# Patient Record
Sex: FEMALE | Race: OTHER | Employment: UNEMPLOYED | URBAN - METROPOLITAN AREA
[De-identification: names, ages, dates, MRNs, and addresses within clinical notes are randomized per-mention and may not be internally consistent; named-entity substitution may affect disease eponyms.]

---

## 2021-04-21 ENCOUNTER — HOSPITAL ENCOUNTER (OUTPATIENT)
Age: 21
Setting detail: SPECIMEN
Discharge: HOME | End: 2021-04-21

## 2021-04-23 LAB
CULTURE: NORMAL
Lab: NORMAL
SPECIMEN DESCRIPTION: NORMAL

## 2021-07-14 ENCOUNTER — NURSE ONLY (OUTPATIENT)
Dept: LAB | Age: 21
End: 2021-07-14

## 2021-07-14 LAB
ERYTHROCYTE [DISTWIDTH] IN BLOOD BY AUTOMATED COUNT: 13.3 % (ref 11.5–14.5)
ERYTHROCYTE [DISTWIDTH] IN BLOOD BY AUTOMATED COUNT: 45.5 FL (ref 35–45)
GESTATIONAL GLUCOSE SCREEN: 117 MG/DL (ref 69–140)
GESTATIONAL SCREEN BASELINE: 91 MG/DL (ref 69–105)
HCT VFR BLD CALC: 34.9 % (ref 37–47)
HEMOGLOBIN: 11.2 GM/DL (ref 12–16)
MCH RBC QN AUTO: 30.2 PG (ref 26–33)
MCHC RBC AUTO-ENTMCNC: 32.1 GM/DL (ref 32.2–35.5)
MCV RBC AUTO: 94.1 FL (ref 81–99)
PLATELET # BLD: 294 THOU/MM3 (ref 130–400)
PMV BLD AUTO: 10.7 FL (ref 9.4–12.4)
RBC # BLD: 3.71 MILL/MM3 (ref 4.2–5.4)
WBC # BLD: 11.7 THOU/MM3 (ref 4.8–10.8)

## 2021-10-04 ENCOUNTER — HOSPITAL ENCOUNTER (OUTPATIENT)
Age: 21
LOS: 1 days | Discharge: HOME | End: 2021-10-04
Attending: OBSTETRICS & GYNECOLOGY | Admitting: OBSTETRICS & GYNECOLOGY

## 2021-10-04 VITALS
WEIGHT: 160 LBS | RESPIRATION RATE: 16 BRPM | HEART RATE: 87 BPM | TEMPERATURE: 98 F | BODY MASS INDEX: 26.66 KG/M2 | DIASTOLIC BLOOD PRESSURE: 68 MMHG | OXYGEN SATURATION: 98 % | HEIGHT: 65 IN | SYSTOLIC BLOOD PRESSURE: 109 MMHG

## 2021-10-04 PROBLEM — O47.9 FALSE LABOR: Status: ACTIVE | Noted: 2021-10-04

## 2021-10-04 PROCEDURE — 2580000003 HC RX 258: Performed by: OBSTETRICS & GYNECOLOGY

## 2021-10-04 PROCEDURE — 6370000000 HC RX 637 (ALT 250 FOR IP): Performed by: OBSTETRICS & GYNECOLOGY

## 2021-10-04 PROCEDURE — 96361 HYDRATE IV INFUSION ADD-ON: CPT

## 2021-10-04 PROCEDURE — 96360 HYDRATION IV INFUSION INIT: CPT

## 2021-10-04 RX ORDER — SODIUM CHLORIDE, SODIUM LACTATE, POTASSIUM CHLORIDE, AND CALCIUM CHLORIDE .6; .31; .03; .02 G/100ML; G/100ML; G/100ML; G/100ML
1000 INJECTION, SOLUTION INTRAVENOUS ONCE
Status: COMPLETED | OUTPATIENT
Start: 2021-10-04 | End: 2021-10-04

## 2021-10-04 RX ORDER — ACETAMINOPHEN 500 MG
1000 TABLET ORAL EVERY 6 HOURS PRN
Status: DISCONTINUED | OUTPATIENT
Start: 2021-10-04 | End: 2021-10-04 | Stop reason: HOSPADM

## 2021-10-04 RX ADMIN — ACETAMINOPHEN 1000 MG: 500 TABLET ORAL at 07:28

## 2021-10-04 RX ADMIN — SODIUM CHLORIDE, POTASSIUM CHLORIDE, SODIUM LACTATE AND CALCIUM CHLORIDE 1000 ML: 600; 310; 30; 20 INJECTION, SOLUTION INTRAVENOUS at 07:25

## 2021-10-04 RX ADMIN — SODIUM CHLORIDE, POTASSIUM CHLORIDE, SODIUM LACTATE AND CALCIUM CHLORIDE 1000 ML: 600; 310; 30; 20 INJECTION, SOLUTION INTRAVENOUS at 08:12

## 2021-10-04 ASSESSMENT — PAIN SCALES - GENERAL: PAINLEVEL_OUTOF10: 8

## 2021-10-04 ASSESSMENT — PAIN DESCRIPTION - DESCRIPTORS: DESCRIPTORS: CRAMPING

## 2021-10-04 NOTE — FLOWSHEET NOTE
2nd bag of LR bolus almost complete. Pt's contractions have spaced out but according to the  (Video Remote Interpreting by [employer] ipad) her contractions remain at an 8/10 when she does have them. Pt states the tylenol hasn't helped at all. Will recheck cervix and update Dr Yaquelin Lee. Pt denies any questions or other concerns.

## 2021-10-04 NOTE — FLOWSHEET NOTE
Pt's friend, Kobe Garrido, notified that we are discharging her home. States she will come and pick her up.

## 2021-10-04 NOTE — PLAN OF CARE
Problem: Pain:  Goal: Pain level will decrease  Description: Pain level will decrease  Outcome: Completed  Goal: Control of acute pain  Description: Control of acute pain  Outcome: Ongoing    Pt c/o contraction pain, 8/10. Tylenol given, will continue to monitor. Pain goal 4/10    Goal: Control of chronic pain  Description: Control of chronic pain  Outcome: Completed     Problem: Healthcare acquired conditions:  Goal: Absence of healthcare acquired conditions  Description: Absence of healthcare acquired conditions  Outcome: Ongoing    Vitals stable. EFM strip reactive. Will continue to monitor. Problem: Discharge Planning:  Goal: Discharged to appropriate level of care  Description: Discharged to appropriate level of care  Outcome: Ongoing   Pt ok with going home if the contractions slow down. Denies any other needs at this time. Sofia Lake,  at bedside along with the video interpreting service. Care plan reviewed with patient via . Patient and  Sofia Lake verbalize understanding of the plan of care and contribute to goal setting.

## 2021-10-04 NOTE — FLOWSHEET NOTE
Dr Romy Reyes phoned unit for update. EFM strip remains reactive. Pt's contractions have spaced out but she states they are still a 8/10 and the tylenol didn't help. VE unchanged. Dr Romy Reyes states to discharge pt home and she needs to rest today and drink 10 bottles of water. Pt does not drink enough water at home on a daily basis. Pt is scheduled Thursday for her repeat c/s. Will use  ipad for discharge.

## 2021-10-04 NOTE — FLOWSHEET NOTE
phone used with Génesis Alejandre #[id number]. RN and  reviewing discharge instructions reviewed, pt voiced understanding.

## 2021-10-04 NOTE — FLOWSHEET NOTE
38w 6d patient of Dr. Stephany Deluca presents to L&D for c/o leaking of fluids and pain. Patient notes she is to be a repeat  and is scheduled on Thursday. Pt notes positive fetal movement. Leaking of fluids that started last night. Denies any vaginal bleeding. Contractions are irregular. Gown given to change into. Patient to bathroom to void, informed of maternal drug testing policy in place on all laboring patients. Verbal consent received, paper consent to be signed and urine to be sent. Explained patients right to have family, representative or physician notified of their admission. Patient has Declined for physician to be notified. Patient has Declined for family/representative to be notified.

## 2021-10-07 ENCOUNTER — ANESTHESIA EVENT (OUTPATIENT)
Dept: LABOR AND DELIVERY | Age: 21
DRG: 540 | End: 2021-10-07
Payer: MEDICAID

## 2021-10-07 ENCOUNTER — HOSPITAL ENCOUNTER (INPATIENT)
Age: 21
LOS: 2 days | Discharge: HOME | DRG: 540 | End: 2021-10-09
Attending: OBSTETRICS & GYNECOLOGY | Admitting: OBSTETRICS & GYNECOLOGY
Payer: MEDICAID

## 2021-10-07 ENCOUNTER — ANESTHESIA (OUTPATIENT)
Dept: LABOR AND DELIVERY | Age: 21
DRG: 540 | End: 2021-10-07
Payer: MEDICAID

## 2021-10-07 VITALS — OXYGEN SATURATION: 100 % | SYSTOLIC BLOOD PRESSURE: 118 MMHG | DIASTOLIC BLOOD PRESSURE: 70 MMHG

## 2021-10-07 LAB
ABO: NORMAL
AMPHETAMINE+METHAMPHETAMINE URINE SCREEN: NEGATIVE
ANTIBODY SCREEN: NORMAL
BARBITURATE QUANTITATIVE URINE: NEGATIVE
BENZODIAZEPINE QUANTITATIVE URINE: NEGATIVE
CANNABINOID QUANTITATIVE URINE: NEGATIVE
COCAINE METABOLITE QUANTITATIVE URINE: NEGATIVE
ERYTHROCYTE [DISTWIDTH] IN BLOOD BY AUTOMATED COUNT: 13.2 % (ref 11.5–14.5)
ERYTHROCYTE [DISTWIDTH] IN BLOOD BY AUTOMATED COUNT: 43.9 FL (ref 35–45)
HCT VFR BLD CALC: 36.5 % (ref 37–47)
HEMOGLOBIN: 12 GM/DL (ref 12–16)
MCH RBC QN AUTO: 30 PG (ref 26–33)
MCHC RBC AUTO-ENTMCNC: 32.9 GM/DL (ref 32.2–35.5)
MCV RBC AUTO: 91.3 FL (ref 81–99)
OPIATES, URINE: NEGATIVE
OXYCODONE: NEGATIVE
PHENCYCLIDINE QUANTITATIVE URINE: NEGATIVE
PLATELET # BLD: 265 THOU/MM3 (ref 130–400)
PMV BLD AUTO: 10.4 FL (ref 9.4–12.4)
RBC # BLD: 4 MILL/MM3 (ref 4.2–5.4)
RH FACTOR: NORMAL
RPR: NONREACTIVE
WBC # BLD: 8.1 THOU/MM3 (ref 4.8–10.8)

## 2021-10-07 PROCEDURE — 36415 COLL VENOUS BLD VENIPUNCTURE: CPT

## 2021-10-07 PROCEDURE — 2500000003 HC RX 250 WO HCPCS: Performed by: OBSTETRICS & GYNECOLOGY

## 2021-10-07 PROCEDURE — 1220000000 HC SEMI PRIVATE OB R&B

## 2021-10-07 PROCEDURE — 6370000000 HC RX 637 (ALT 250 FOR IP): Performed by: OBSTETRICS & GYNECOLOGY

## 2021-10-07 PROCEDURE — 6360000002 HC RX W HCPCS

## 2021-10-07 PROCEDURE — 2709999900 HC NON-CHARGEABLE SUPPLY: Performed by: OBSTETRICS & GYNECOLOGY

## 2021-10-07 PROCEDURE — 2580000003 HC RX 258: Performed by: ANESTHESIOLOGY

## 2021-10-07 PROCEDURE — 80307 DRUG TEST PRSMV CHEM ANLYZR: CPT

## 2021-10-07 PROCEDURE — 86592 SYPHILIS TEST NON-TREP QUAL: CPT

## 2021-10-07 PROCEDURE — 86901 BLOOD TYPING SEROLOGIC RH(D): CPT

## 2021-10-07 PROCEDURE — 3700000000 HC ANESTHESIA ATTENDED CARE: Performed by: OBSTETRICS & GYNECOLOGY

## 2021-10-07 PROCEDURE — 6360000002 HC RX W HCPCS: Performed by: OBSTETRICS & GYNECOLOGY

## 2021-10-07 PROCEDURE — 3609079900 HC CESAREAN SECTION: Performed by: OBSTETRICS & GYNECOLOGY

## 2021-10-07 PROCEDURE — 3700000001 HC ADD 15 MINUTES (ANESTHESIA): Performed by: OBSTETRICS & GYNECOLOGY

## 2021-10-07 PROCEDURE — 2580000003 HC RX 258: Performed by: OBSTETRICS & GYNECOLOGY

## 2021-10-07 PROCEDURE — 86850 RBC ANTIBODY SCREEN: CPT

## 2021-10-07 PROCEDURE — 2500000003 HC RX 250 WO HCPCS: Performed by: NURSE ANESTHETIST, CERTIFIED REGISTERED

## 2021-10-07 PROCEDURE — 6360000002 HC RX W HCPCS: Performed by: ANESTHESIOLOGY

## 2021-10-07 PROCEDURE — 6360000002 HC RX W HCPCS: Performed by: NURSE ANESTHETIST, CERTIFIED REGISTERED

## 2021-10-07 PROCEDURE — 7100000001 HC PACU RECOVERY - ADDTL 15 MIN: Performed by: OBSTETRICS & GYNECOLOGY

## 2021-10-07 PROCEDURE — 86900 BLOOD TYPING SEROLOGIC ABO: CPT

## 2021-10-07 PROCEDURE — 7100000000 HC PACU RECOVERY - FIRST 15 MIN: Performed by: OBSTETRICS & GYNECOLOGY

## 2021-10-07 PROCEDURE — 85027 COMPLETE CBC AUTOMATED: CPT

## 2021-10-07 RX ORDER — BUPIVACAINE HYDROCHLORIDE 7.5 MG/ML
INJECTION, SOLUTION INTRASPINAL PRN
Status: DISCONTINUED | OUTPATIENT
Start: 2021-10-07 | End: 2021-10-07 | Stop reason: SDUPTHER

## 2021-10-07 RX ORDER — CEFAZOLIN SODIUM 1 G/50ML
1000 INJECTION, SOLUTION INTRAVENOUS ONCE
Status: COMPLETED | OUTPATIENT
Start: 2021-10-07 | End: 2021-10-07

## 2021-10-07 RX ORDER — 0.9 % SODIUM CHLORIDE 0.9 %
1000 INTRAVENOUS SOLUTION INTRAVENOUS ONCE
Status: COMPLETED | OUTPATIENT
Start: 2021-10-07 | End: 2021-10-07

## 2021-10-07 RX ORDER — PROMETHAZINE HYDROCHLORIDE 25 MG/ML
12.5 INJECTION, SOLUTION INTRAMUSCULAR; INTRAVENOUS EVERY 6 HOURS PRN
Status: DISCONTINUED | OUTPATIENT
Start: 2021-10-07 | End: 2021-10-09 | Stop reason: HOSPADM

## 2021-10-07 RX ORDER — KETOROLAC TROMETHAMINE 30 MG/ML
15 INJECTION, SOLUTION INTRAMUSCULAR; INTRAVENOUS EVERY 6 HOURS
Status: COMPLETED | OUTPATIENT
Start: 2021-10-07 | End: 2021-10-08

## 2021-10-07 RX ORDER — PRENATAL WITH FERROUS FUM AND FOLIC ACID 3080; 920; 120; 400; 22; 1.84; 3; 20; 10; 1; 12; 200; 27; 25; 2 [IU]/1; [IU]/1; MG/1; [IU]/1; MG/1; MG/1; MG/1; MG/1; MG/1; MG/1; UG/1; MG/1; MG/1; MG/1; MG/1
1 TABLET ORAL DAILY
Status: DISCONTINUED | OUTPATIENT
Start: 2021-10-07 | End: 2021-10-09 | Stop reason: HOSPADM

## 2021-10-07 RX ORDER — MORPHINE SULFATE 4 MG/ML
4 INJECTION, SOLUTION INTRAMUSCULAR; INTRAVENOUS
Status: DISCONTINUED | OUTPATIENT
Start: 2021-10-07 | End: 2021-10-09 | Stop reason: HOSPADM

## 2021-10-07 RX ORDER — TRISODIUM CITRATE DIHYDRATE AND CITRIC ACID MONOHYDRATE 500; 334 MG/5ML; MG/5ML
15 SOLUTION ORAL ONCE
Status: COMPLETED | OUTPATIENT
Start: 2021-10-07 | End: 2021-10-07

## 2021-10-07 RX ORDER — METHYLERGONOVINE MALEATE 0.2 MG/ML
200 INJECTION INTRAVENOUS PRN
Status: DISCONTINUED | OUTPATIENT
Start: 2021-10-07 | End: 2021-10-09 | Stop reason: HOSPADM

## 2021-10-07 RX ORDER — SODIUM CHLORIDE, SODIUM LACTATE, POTASSIUM CHLORIDE, CALCIUM CHLORIDE 600; 310; 30; 20 MG/100ML; MG/100ML; MG/100ML; MG/100ML
INJECTION, SOLUTION INTRAVENOUS CONTINUOUS
Status: DISCONTINUED | OUTPATIENT
Start: 2021-10-07 | End: 2021-10-09 | Stop reason: HOSPADM

## 2021-10-07 RX ORDER — KETOROLAC TROMETHAMINE 30 MG/ML
INJECTION, SOLUTION INTRAMUSCULAR; INTRAVENOUS PRN
Status: DISCONTINUED | OUTPATIENT
Start: 2021-10-07 | End: 2021-10-07

## 2021-10-07 RX ORDER — HYDROCODONE BITARTRATE AND ACETAMINOPHEN 5; 325 MG/1; MG/1
1 TABLET ORAL EVERY 4 HOURS PRN
Status: ACTIVE | OUTPATIENT
Start: 2021-10-07 | End: 2021-10-08

## 2021-10-07 RX ORDER — FERROUS SULFATE 325(65) MG
325 TABLET ORAL
Status: DISCONTINUED | OUTPATIENT
Start: 2021-10-08 | End: 2021-10-09 | Stop reason: HOSPADM

## 2021-10-07 RX ORDER — KETOROLAC TROMETHAMINE 30 MG/ML
INJECTION, SOLUTION INTRAMUSCULAR; INTRAVENOUS PRN
Status: DISCONTINUED | OUTPATIENT
Start: 2021-10-07 | End: 2021-10-07 | Stop reason: SDUPTHER

## 2021-10-07 RX ORDER — MISOPROSTOL 200 UG/1
800 TABLET ORAL PRN
Status: DISCONTINUED | OUTPATIENT
Start: 2021-10-07 | End: 2021-10-09 | Stop reason: HOSPADM

## 2021-10-07 RX ORDER — MORPHINE SULFATE 2 MG/ML
2 INJECTION, SOLUTION INTRAMUSCULAR; INTRAVENOUS
Status: DISCONTINUED | OUTPATIENT
Start: 2021-10-07 | End: 2021-10-09 | Stop reason: HOSPADM

## 2021-10-07 RX ORDER — OXYCODONE HYDROCHLORIDE 5 MG/1
5 TABLET ORAL EVERY 4 HOURS PRN
Status: DISCONTINUED | OUTPATIENT
Start: 2021-10-07 | End: 2021-10-09 | Stop reason: HOSPADM

## 2021-10-07 RX ORDER — ACETAMINOPHEN 500 MG
1000 TABLET ORAL EVERY 8 HOURS SCHEDULED
Status: DISCONTINUED | OUTPATIENT
Start: 2021-10-07 | End: 2021-10-09 | Stop reason: HOSPADM

## 2021-10-07 RX ORDER — IBUPROFEN 800 MG/1
800 TABLET ORAL EVERY 8 HOURS
Status: DISCONTINUED | OUTPATIENT
Start: 2021-10-08 | End: 2021-10-09 | Stop reason: HOSPADM

## 2021-10-07 RX ORDER — ONDANSETRON 2 MG/ML
INJECTION INTRAMUSCULAR; INTRAVENOUS PRN
Status: DISCONTINUED | OUTPATIENT
Start: 2021-10-07 | End: 2021-10-07 | Stop reason: SDUPTHER

## 2021-10-07 RX ORDER — ONDANSETRON 2 MG/ML
4 INJECTION INTRAMUSCULAR; INTRAVENOUS EVERY 6 HOURS PRN
Status: DISCONTINUED | OUTPATIENT
Start: 2021-10-07 | End: 2021-10-07

## 2021-10-07 RX ORDER — ONDANSETRON 2 MG/ML
4 INJECTION INTRAMUSCULAR; INTRAVENOUS EVERY 6 HOURS PRN
Status: DISCONTINUED | OUTPATIENT
Start: 2021-10-07 | End: 2021-10-09 | Stop reason: HOSPADM

## 2021-10-07 RX ORDER — DIPHENHYDRAMINE HYDROCHLORIDE 50 MG/ML
25 INJECTION INTRAMUSCULAR; INTRAVENOUS EVERY 6 HOURS PRN
Status: DISCONTINUED | OUTPATIENT
Start: 2021-10-07 | End: 2021-10-09 | Stop reason: HOSPADM

## 2021-10-07 RX ORDER — BISACODYL 10 MG
10 SUPPOSITORY, RECTAL RECTAL DAILY PRN
Status: DISCONTINUED | OUTPATIENT
Start: 2021-10-07 | End: 2021-10-09 | Stop reason: HOSPADM

## 2021-10-07 RX ORDER — FENTANYL CITRATE 50 UG/ML
INJECTION, SOLUTION INTRAMUSCULAR; INTRAVENOUS PRN
Status: DISCONTINUED | OUTPATIENT
Start: 2021-10-07 | End: 2021-10-07 | Stop reason: SDUPTHER

## 2021-10-07 RX ORDER — SODIUM CHLORIDE 0.9 % (FLUSH) 0.9 %
10 SYRINGE (ML) INJECTION PRN
Status: DISCONTINUED | OUTPATIENT
Start: 2021-10-07 | End: 2021-10-09 | Stop reason: HOSPADM

## 2021-10-07 RX ORDER — MODIFIED LANOLIN
OINTMENT (GRAM) TOPICAL
Status: DISCONTINUED | OUTPATIENT
Start: 2021-10-07 | End: 2021-10-09 | Stop reason: HOSPADM

## 2021-10-07 RX ORDER — SIMETHICONE 80 MG
80 TABLET,CHEWABLE ORAL EVERY 6 HOURS PRN
Status: DISCONTINUED | OUTPATIENT
Start: 2021-10-07 | End: 2021-10-09 | Stop reason: HOSPADM

## 2021-10-07 RX ORDER — HYDROCODONE BITARTRATE AND ACETAMINOPHEN 5; 325 MG/1; MG/1
2 TABLET ORAL EVERY 4 HOURS PRN
Status: DISPENSED | OUTPATIENT
Start: 2021-10-07 | End: 2021-10-08

## 2021-10-07 RX ORDER — MORPHINE SULFATE 0.5 MG/ML
INJECTION, SOLUTION EPIDURAL; INTRATHECAL; INTRAVENOUS PRN
Status: DISCONTINUED | OUTPATIENT
Start: 2021-10-07 | End: 2021-10-07 | Stop reason: SDUPTHER

## 2021-10-07 RX ORDER — ONDANSETRON 4 MG/1
8 TABLET, ORALLY DISINTEGRATING ORAL EVERY 8 HOURS PRN
Status: DISCONTINUED | OUTPATIENT
Start: 2021-10-07 | End: 2021-10-09 | Stop reason: HOSPADM

## 2021-10-07 RX ORDER — DOCUSATE SODIUM 100 MG/1
100 CAPSULE, LIQUID FILLED ORAL 2 TIMES DAILY
Status: DISCONTINUED | OUTPATIENT
Start: 2021-10-07 | End: 2021-10-09 | Stop reason: HOSPADM

## 2021-10-07 RX ORDER — METOCLOPRAMIDE HYDROCHLORIDE 5 MG/ML
10 INJECTION INTRAMUSCULAR; INTRAVENOUS ONCE
Status: COMPLETED | OUTPATIENT
Start: 2021-10-07 | End: 2021-10-07

## 2021-10-07 RX ORDER — DIPHENHYDRAMINE HYDROCHLORIDE 50 MG/ML
25 INJECTION INTRAMUSCULAR; INTRAVENOUS EVERY 6 HOURS PRN
Status: ACTIVE | OUTPATIENT
Start: 2021-10-07 | End: 2021-10-08

## 2021-10-07 RX ORDER — ONDANSETRON 2 MG/ML
4 INJECTION INTRAMUSCULAR; INTRAVENOUS EVERY 6 HOURS PRN
Status: DISPENSED | OUTPATIENT
Start: 2021-10-07 | End: 2021-10-08

## 2021-10-07 RX ORDER — SODIUM CHLORIDE 0.9 % (FLUSH) 0.9 %
10 SYRINGE (ML) INJECTION EVERY 12 HOURS SCHEDULED
Status: DISCONTINUED | OUTPATIENT
Start: 2021-10-07 | End: 2021-10-09

## 2021-10-07 RX ORDER — ACETAMINOPHEN 325 MG/1
325 TABLET ORAL EVERY 4 HOURS PRN
Status: ACTIVE | OUTPATIENT
Start: 2021-10-07 | End: 2021-10-08

## 2021-10-07 RX ORDER — NALOXONE HYDROCHLORIDE 0.4 MG/ML
0.4 INJECTION, SOLUTION INTRAMUSCULAR; INTRAVENOUS; SUBCUTANEOUS PRN
Status: DISCONTINUED | OUTPATIENT
Start: 2021-10-07 | End: 2021-10-09 | Stop reason: HOSPADM

## 2021-10-07 RX ORDER — OXYCODONE HYDROCHLORIDE 5 MG/1
10 TABLET ORAL EVERY 4 HOURS PRN
Status: DISCONTINUED | OUTPATIENT
Start: 2021-10-07 | End: 2021-10-09 | Stop reason: HOSPADM

## 2021-10-07 RX ORDER — SODIUM CHLORIDE, SODIUM LACTATE, POTASSIUM CHLORIDE, AND CALCIUM CHLORIDE .6; .31; .03; .02 G/100ML; G/100ML; G/100ML; G/100ML
1000 INJECTION, SOLUTION INTRAVENOUS ONCE
Status: COMPLETED | OUTPATIENT
Start: 2021-10-07 | End: 2021-10-07

## 2021-10-07 RX ORDER — SODIUM CHLORIDE 9 MG/ML
25 INJECTION, SOLUTION INTRAVENOUS PRN
Status: DISCONTINUED | OUTPATIENT
Start: 2021-10-07 | End: 2021-10-09 | Stop reason: HOSPADM

## 2021-10-07 RX ORDER — ACETAMINOPHEN 325 MG/1
975 TABLET ORAL ONCE
Status: COMPLETED | OUTPATIENT
Start: 2021-10-07 | End: 2021-10-07

## 2021-10-07 RX ORDER — KETOROLAC TROMETHAMINE 30 MG/ML
30 INJECTION, SOLUTION INTRAMUSCULAR; INTRAVENOUS EVERY 6 HOURS
Status: DISPENSED | OUTPATIENT
Start: 2021-10-07 | End: 2021-10-08

## 2021-10-07 RX ORDER — SODIUM CHLORIDE, SODIUM LACTATE, POTASSIUM CHLORIDE, CALCIUM CHLORIDE 600; 310; 30; 20 MG/100ML; MG/100ML; MG/100ML; MG/100ML
INJECTION, SOLUTION INTRAVENOUS CONTINUOUS
Status: DISCONTINUED | OUTPATIENT
Start: 2021-10-07 | End: 2021-10-07

## 2021-10-07 RX ORDER — CARBOPROST TROMETHAMINE 250 UG/ML
250 INJECTION, SOLUTION INTRAMUSCULAR PRN
Status: DISCONTINUED | OUTPATIENT
Start: 2021-10-07 | End: 2021-10-09 | Stop reason: HOSPADM

## 2021-10-07 RX ORDER — OXYTOCIN 10 [USP'U]/ML
INJECTION, SOLUTION INTRAMUSCULAR; INTRAVENOUS PRN
Status: DISCONTINUED | OUTPATIENT
Start: 2021-10-07 | End: 2021-10-07 | Stop reason: SDUPTHER

## 2021-10-07 RX ADMIN — METOCLOPRAMIDE 10 MG: 5 INJECTION, SOLUTION INTRAMUSCULAR; INTRAVENOUS at 11:21

## 2021-10-07 RX ADMIN — ONDANSETRON HYDROCHLORIDE 4 MG: 4 INJECTION, SOLUTION INTRAMUSCULAR; INTRAVENOUS at 12:47

## 2021-10-07 RX ADMIN — BUPIVACAINE HYDROCHLORIDE 1.6 ML: 7.5 INJECTION, SOLUTION SUBARACHNOID at 12:10

## 2021-10-07 RX ADMIN — PROMETHAZINE HYDROCHLORIDE 12.5 MG: 25 INJECTION INTRAMUSCULAR; INTRAVENOUS at 21:18

## 2021-10-07 RX ADMIN — OXYTOCIN 20 ML: 10 INJECTION, SOLUTION INTRAMUSCULAR; INTRAVENOUS at 12:34

## 2021-10-07 RX ADMIN — ACETAMINOPHEN 325 MG: 325 TABLET ORAL at 11:21

## 2021-10-07 RX ADMIN — KETOROLAC TROMETHAMINE 30 MG: 30 INJECTION, SOLUTION INTRAMUSCULAR; INTRAVENOUS at 12:59

## 2021-10-07 RX ADMIN — FENTANYL CITRATE 80 MCG: 50 INJECTION, SOLUTION INTRAMUSCULAR; INTRAVENOUS at 12:47

## 2021-10-07 RX ADMIN — ONDANSETRON 4 MG: 2 INJECTION INTRAMUSCULAR; INTRAVENOUS at 18:30

## 2021-10-07 RX ADMIN — SODIUM CHLORIDE, POTASSIUM CHLORIDE, SODIUM LACTATE AND CALCIUM CHLORIDE: 600; 310; 30; 20 INJECTION, SOLUTION INTRAVENOUS at 11:23

## 2021-10-07 RX ADMIN — KETOROLAC TROMETHAMINE 15 MG: 30 INJECTION, SOLUTION INTRAMUSCULAR; INTRAVENOUS at 18:31

## 2021-10-07 RX ADMIN — SODIUM CHLORIDE, POTASSIUM CHLORIDE, SODIUM LACTATE AND CALCIUM CHLORIDE 1000 ML: 600; 310; 30; 20 INJECTION, SOLUTION INTRAVENOUS at 10:15

## 2021-10-07 RX ADMIN — DIPHENHYDRAMINE HYDROCHLORIDE 25 MG: 50 INJECTION INTRAMUSCULAR; INTRAVENOUS at 18:31

## 2021-10-07 RX ADMIN — CEFAZOLIN SODIUM 1000 MG: 1 INJECTION, SOLUTION INTRAVENOUS at 11:19

## 2021-10-07 RX ADMIN — SODIUM CHLORIDE 1000 ML: 9 INJECTION, SOLUTION INTRAVENOUS at 21:18

## 2021-10-07 RX ADMIN — OXYCODONE 10 MG: 5 TABLET ORAL at 16:36

## 2021-10-07 RX ADMIN — MORPHINE SULFATE 0.2 MG: 0.5 INJECTION, SOLUTION EPIDURAL; INTRATHECAL; INTRAVENOUS at 12:10

## 2021-10-07 RX ADMIN — SODIUM CITRATE AND CITRIC ACID MONOHYDRATE 15 ML: 500; 334 SOLUTION ORAL at 11:20

## 2021-10-07 RX ADMIN — FENTANYL CITRATE 20 MCG: 50 INJECTION, SOLUTION INTRAMUSCULAR; INTRAVENOUS at 12:10

## 2021-10-07 RX ADMIN — KETOROLAC TROMETHAMINE 30 MG: 30 INJECTION, SOLUTION INTRAMUSCULAR at 12:58

## 2021-10-07 RX ADMIN — FAMOTIDINE 20 MG: 10 INJECTION, SOLUTION INTRAVENOUS at 11:20

## 2021-10-07 RX ADMIN — Medication 87.3 MILLI-UNITS/MIN: at 13:20

## 2021-10-07 ASSESSMENT — PULMONARY FUNCTION TESTS
PIF_VALUE: 0
PIF_VALUE: 0
PIF_VALUE: 2
PIF_VALUE: 0
PIF_VALUE: 2
PIF_VALUE: 0
PIF_VALUE: 1
PIF_VALUE: 0
PIF_VALUE: 19
PIF_VALUE: 0
PIF_VALUE: 2
PIF_VALUE: 2
PIF_VALUE: 0
PIF_VALUE: 2
PIF_VALUE: 0
PIF_VALUE: 2
PIF_VALUE: 0
PIF_VALUE: 0
PIF_VALUE: 3
PIF_VALUE: 0
PIF_VALUE: 2
PIF_VALUE: 0
PIF_VALUE: 3
PIF_VALUE: 2
PIF_VALUE: 0
PIF_VALUE: 2
PIF_VALUE: 3
PIF_VALUE: 0
PIF_VALUE: 0
PIF_VALUE: 1
PIF_VALUE: 0
PIF_VALUE: 2
PIF_VALUE: 0
PIF_VALUE: 0
PIF_VALUE: 2
PIF_VALUE: 2
PIF_VALUE: 0
PIF_VALUE: 3
PIF_VALUE: 0
PIF_VALUE: 2
PIF_VALUE: 0

## 2021-10-07 ASSESSMENT — PAIN DESCRIPTION - LOCATION
LOCATION: ABDOMEN;INCISION
LOCATION: ABDOMEN;INCISION

## 2021-10-07 ASSESSMENT — PAIN DESCRIPTION - FREQUENCY
FREQUENCY: CONTINUOUS
FREQUENCY: CONTINUOUS

## 2021-10-07 ASSESSMENT — PAIN DESCRIPTION - ONSET
ONSET: ON-GOING
ONSET: ON-GOING

## 2021-10-07 ASSESSMENT — PAIN SCALES - GENERAL
PAINLEVEL_OUTOF10: 0
PAINLEVEL_OUTOF10: 7
PAINLEVEL_OUTOF10: 4
PAINLEVEL_OUTOF10: 0

## 2021-10-07 ASSESSMENT — PAIN DESCRIPTION - DESCRIPTORS
DESCRIPTORS: DISCOMFORT
DESCRIPTORS: DISCOMFORT

## 2021-10-07 ASSESSMENT — PAIN DESCRIPTION - PROGRESSION
CLINICAL_PROGRESSION: NOT CHANGED
CLINICAL_PROGRESSION: NOT CHANGED

## 2021-10-07 ASSESSMENT — PAIN DESCRIPTION - PAIN TYPE
TYPE: SURGICAL PAIN
TYPE: SURGICAL PAIN

## 2021-10-07 NOTE — FLOWSHEET NOTE
RN connecting to interpreting service Nicolasa Marin #[id number] reviewing POC, consents and next steps in the process. Pt voiced understanding, consents signed.

## 2021-10-07 NOTE — H&P
[facility]  History and Physical    Patient Name: Carlos Quiroz   Patient ID: [id number]   Sex: Female   YOB: 2000         Create Date: 2021                   History Of Present Illness   This is a 24year old female, , who is at 44 weeks gestation with an WILVER of 10/12/2021 presenting for Repeat  Section. Patient has a medical history significant for History of  delivery, currently pregnant, Language barrier affecting health care, and  delivery. Her pregnancy has been complicated by late to care, language barrier, previous  section   Patient admits fetal movements and contractions and denies leaking of fluid.          Past Medical History   Disease Name Date Onset Notes   Anemia affecting pregnancy in third trimester 2021 --    History of  delivery, currently pregnant 2021 --    History of  delivery, currently pregnant 2021 --    History of  labor, current pregnancy 2021 --    HPV (human papilloma virus) infection 21 +HR   Insufficient prenatal care --  --    Language barrier affecting health care 2021 --    Papanicolaou smear of cervix with low grade squamous intraepithelial lesion (LGSIL) 21 +HR HPV    delivery --  --            Past Surgical History   Procedure Name Date Notes   Primary  section 10/26/2016 [place]            Medication List   Name Date Started Instructions   Prenatal Vitamin 27 mg iron-0.8 mg tablet 2021 take 1 tablet by oral route daily for 30 days           Allergy List   Allergen Name Date Reaction Notes   NO KNOWN DRUG ALLERGIES --  --  --    No Known Environmental Allergies --  --  --    No Known Food Allergies --  --  --            Family Medical History   Disease Name Relative/Age Notes   Family history of bowel disorder Son/   due to being born premature           Reproductive History   Menstrual   Last Menstrual Period: 2021   Pregnancy Summary   Total Pregnancies: 2 Full Term: 0 Premature: 1   Ab Induced: 0 Ab Spontaneous: 0 Ectopics: 0   Multiples: 0 Livin   Pregnancy Details    Date GA Hrs Labor Birth Wt Sex Type Delivery Anes? Early Labor?  Comments/ Complications Location   10/26/2016 35   Male C-Sect.  Yes pre-term labor/delivery [place]           Social History   Finding Status Start/Stop Quantity Notes   Alcohol Never --/-- --  --    Aramis Revels CNP --  --/-- --  --    Denies emotional/verbal abuse --  --/-- --  --    Denies physical abuse --  --/-- --  --    Denies Sexual Abuse --  --/-- --  --    No abuse of any substances --  --/-- --  --    Single --  --/-- --  --    Tobacco Never --/-- --  --            Review of Systems   ConstitutionalDenies : fever   EyesDenies : impaired vision, additional symptoms except as noted in the HPI   HENTDenies : headaches, sinus congestion, neck stiffness, sore throat, decreased hearing, additional symptoms except as noted in the HPI   BreastsDenies : nipple discharge, additional symptoms except as noted in the HPI   CardiovascularDenies : chest pain, irregular heart beats, syncope, lower extremity edema, palpitations, additional symptoms except as noted in the HPI   RespiratoryDenies : shortness of breath, wheezing, cough, additional symptoms except as noted in the HPI   GastrointestinalDenies : nausea, vomiting, diarrhea, constipation, abdominal pain   GenitourinaryDenies : dysuria, vaginal discharge, vaginal bleeding, cramping, pelvic pressure   IntegumentDenies : rash, changes to existing skin lesions or moles, additional symptoms except as noted in the HPI   NeurologicAdmits : headache   MusculoskeletalDenies : back pain   EndocrineDenies : cold intolerance, heat intolerance, additional symptoms except as noted in the HPI   PsychiatricDenies : addtional symptoms except as noted in the HPI   Heme-LymphDenies : easy bruising, lymph node enlargement or tenderness, addtional symptoms except as noted in the HPI   Allergic-ImmunologicDenies : frequent illnesses, addtional symptoms except as noted in the HPI       Vitals   Date Time BP Position Site L\R Cuff Size HR RR TEMP (F) WT  HT  BMI kg/m2 BSA m2 O2 Sat FR L/min FiO2 HC       09/16/2021 03:41 /68 Sitting       152lbs 0oz 5'  6\" 24.53 1.79               Physical Examination   ConstitutionalAppearance : ob patient   EyesConjunctiva/Eyelids : conjunctiva normal, eyelid appearance normal   Sclera : sclera white   HENTHead and Face :   Head : normocephalic, atraumatic   Ears :   External Ears : external ears within normal limits   Hearing : hearing intact bilaterally   Nose :   External Nose : external nose normal in appearance, nares patent, nasal discharge absent   Mouth :   General : appearance normal   Lips : lip appearance normal   NeckInspection/Palpation : normal appearance, no masses or tenderness   Lymph Nodes : no lymphadenopathy present   Range of Motion : neck supple with full range of motion   Thyroid : gland size normal, nontender, no nodules or masses present on palpation   ChestRespiratory Effort : breathing unlabored   Auscultation : normal breath sounds, no rales, no rhonchi   CardiovascularHeart :    Auscultation : regular rate, normal rhythm, no murmurs present   Peripheral Vascular System :   Femoral Arteries : normal femoral pulses, no bruits present   Peripheral Circulation : no edema, no cyanosis   BreastsInspection of Breasts : Breasts symmetrical, no skin changes, no discharge present   Palpation of Breasts and Axillae : No masses present on palpation, no breast tenderness   Axillary Lymph Nodes : no lymphadenopathy present   GastrointestinalAbdominal Examination : abdomen nontender to palpation, normal bowel sounds, tone normal without rigidity or guarding, no masses present, umbilicus without lesions   Liver and spleen : no hepatomegaly present, liver nontender to palpation   Hernias : no hernias present   GenitourinaryExternal Genitalia : normal appearance for age, no discharge present, no tenderness present, no inflammatory lesions present   Vagina : normal vaginal vault without central or paravaginal defects, no discharge present, no inflammatory lesions present, no masses present   Bladder : nontender to palpation   Urethra :   Urethral Body : urethra palpation normal, urethra structural support normal   Urethral Meatus : no erythema or lesions present   Cervix : appearance healthy, no lesions present, nontender to palpation, no bleeding present   Uterus : nontender to palpation, no masses present, position midline/midplane, mobility: normal   Adnexa : no adnexal tenderness present, no adnexal masses present   Perineum : perineum within normal limits, no evidence of trauma, no rashes or skin lesions present   Anus : anus within normal limits, no hemorrhoids present   Inguinal Lymph Nodes : no lymphadenopathy present   LymphaticLymph Nodes : no other lymphadenopathy present   SkinGeneral Inspection : no rashes present, no lesions present, no areas of discoloration   Body Hair : general body hair distribution normal   Pubic Hair : normal pubic hair distribution for age   Genitalia and Groin : no rashes present, no lesions present, no areas of discoloration, no masses present   Neurologic/PsychiatricMental Status :   Orientation : grossly oriented to person, place and time   Mood and Affect : mood normal, affect appropriate   Cranial Nerves : cranial nerves intact bilaterally           Assessment   Anemia affecting pregnancy in third trimester       Anemia complicating pregnancy, third trimester     648.23/O99.013    History of  delivery, currently pregnant     654.20/O34.21    Language barrier affecting health care     V49.89/Z78.9    39 weeks gestation of pregnancy     V22.2/Z3A.39        Plan      Repeat  Section                 Electronically Signed by: Gabi Mandujano.  Reyes Carbajal on October 6, 2021 09:47:25 PM

## 2021-10-07 NOTE — PROGRESS NOTES
Infant has not roomed in with mother this shift due to maternal exhaustion. Benefits of rooming in discussed.

## 2021-10-07 NOTE — FLOWSHEET NOTE
Dr. Neal Mcdonald text messaged pt is here for rpt c-birth and ready. Reactive FHT's.  MD responds she will be here in a little bit

## 2021-10-07 NOTE — FLOWSHEET NOTE
here are scheduled repeat  at 44 2/7wks. PT has FOB Pennelope Faden with her at bedside. Pt says baby is moving, denies regular contractions, leaking of fluid or vaginal bleeding at this time.  Drug screen discussed, pt voiced understanding and plans to give a urine sample

## 2021-10-07 NOTE — INTERVAL H&P NOTE
[address] [address]  History and Physical Update    Pt Name: Nicholas Quiroz  MRN: [id number]  YOB: 2000  Date of evaluation: 10/7/2021    [x] I have examined the patient and reviewed the H&P/Consult and there are no changes to the patient or plans.     [] I have examined the patient and reviewed the H&P/Consult and have noted the following changes:        Chacha Rivera DO,  Electronically signed 10/7/2021 at 11:49 AM

## 2021-10-07 NOTE — L&D DELIVERY NOTE
Department of Obstetrics and Gynecology   Section Note    Pt Name: Rylan Severino  MRN: [id number] [place] #: [de-identified]  YOB: 2000  Procedure Performed By: Davide Nair DO, DO    Indications: patient declines vag del attempt and previous uterine incision    Pre-operative Diagnosis: 39 week pregnancy. Post-operative Diagnosis:  Same, Delivered, Living newbornMale  Procedure:  repeat low transverse  section  Findings:  Normal tubes, ovaries and uterus. Baby Male, Apgars  8/9  Estimated Blood Loss:  700ml         Specimens: None     Complications:  None         Condition: infant stable to general nursery and mother stable    Indications:     Rylan Severino is a 24 y.o. female  at 44w2d who presented for  section for  patient declines vag del attempt and previous uterine incision. She understood the risks and benefits and signed informed consent. Procedure: The patient was taken to the Operating Room where spinal anesthesia was placed. She was placed in the dorsal supine position with a leftward tilt and prepped and draped. A Pfannenstiel incision was made with the scalpel taken down to the fascia. The fascia was nicked in the midline. This incision extended laterally with curved ro scissors. The underlying rectus muscle dissected bluntly and with the Ro scissors. The peritoneum identified and entered bluntly. This incision extended superiorly and inferior with good visualization of the bladder. The vesicouterine peritoneum was identified and entered sharply. This incision extended laterally and the bladder flap created digitally. The uterus was incised in a low transverse fashion and extended digitally. The infant was delivered head first with vacuum assistance. The rest of the baby delivered. The nose and mouth were suctioned. The cord was clamped and cut and the baby was stimulated.   Cord blood was obtained, the placenta manually extracted and delivered intact with a 3 vessel cord. The uterus was  cleared of all clots and debris and repaired with #0 vicryl in a running locked fashion. A second imbricating layer of 0 vicryl was used for uterine strength. Hemostasis was assured with Bovie cautery. The peritoneum was closed with a 2-0 vicryl, the fascia was inspected and found to be hemostatic and closed with 0 vicryl in a continuous fashion. The subcutaneous tissue was made hemostatic with Bovie cautery. The skin was closed with 4 0 vicryl suture (s). Sponge, lap and needle counts were correct x 2. The patient tolerated the procedure well. The patient received antibiotics prior to skin incision. Mother's Information    Labor Events     labor?: No  Rupture type: Intact     Mother Delivery Information    Surgical or Additional Est. Blood Loss (mL): 0 (View Only): Edit in Flowsheets   Combined Est. Blood Loss (mL): [address] [facility], Savana Wanvin Handsome [[id number]]    Labor Events     labor?: No  Cervical ripening date/time:     Rupture date/time: 10/7/21 12:29:00   Rupture type:  Intact, Artificial=AROM  Fluid color: Clear  Fluid odor: None  Labor complications: None          Anesthesia    Method: Spinal     Assisted Delivery Details    Forceps attempted?: No  Vacuum extractor attempted?: Yes  Vacuum type: Silastic cup      Document Additional Attempt       Document Additional Attempt             Shoulder Dystocia    Shoulder dystocia present?: No  Add Second Maneuver  Add Third Maneuver  Add Fourth Maneuver  Add Fifth Maneuver  Add Sixth Maneuver  Add Seventh Maneuver  Add Eighth Maneuver  Add Ninth Maneuver     Ridge Presentation    Presentation: Vertex      Information    Head delivery date/time: 10/7/2021 12:32:00   Changing the 's delivery date/time could affect patient care.:    Delivery date/time:  10/7/21 1232     Details:        Delivery Providers Delivering clinician: Jose Dacosta DO   Provider Role    Siva Sauceda, RN Registered Nurse    Ina Montes, RN Registered Nurse    Austin Wilson, NATE Respiratory Therapist (Day)      Placenta    Date/time: 10/7/2021 12:34:00  Removal: Spontaneous  Appearance: Intact  Disposition: Refrigerator     Delivery Resuscitation    Method: Stimulation, Bulb Suction     Apgars    Living status: Living  Apgars   1 Minute:  5 Minute:  10 Minute 15 Minute 20 Minute   Skin Color: 0  1       Heart Rate: 2  2       Reflex Irritability: 2  2       Muscle Tone: 2  2       Respiratory Effort: 2  2       Total: 8  9               Apgars Assigned By: Philly Meza RN     [place] Measurements    Weight: 3210 g Length: 48.3 cm   Head circumference: 34.3 cm Chest circumference: 32.4 cm      Delivery Information    Surgical or additional est. blood loss (mL): 0 (View Only): Edit in Flowsheets   Combined est. blood loss (mL): 0

## 2021-10-07 NOTE — PROGRESS NOTES
Jacob Gupta #[id number] assisted with assessing patient's needs and discussing plan of care/shift change. All questions answered at this time.

## 2021-10-07 NOTE — ANESTHESIA PROCEDURE NOTES
Spinal Block    Patient location during procedure: OB  Reason for block: primary anesthetic  Staffing  Performed: resident/CRNA   Anesthesiologist: Miladys Rich DO  Resident/CRNA: Vangie Bland APRN - CRNA  Preanesthetic Checklist  Completed: patient identified, IV checked, site marked, risks and benefits discussed, surgical consent, monitors and equipment checked, pre-op evaluation, timeout performed, anesthesia consent given, oxygen available and patient being monitored  Spinal Block  Patient position: sitting  Prep: ChloraPrep  Patient monitoring: cardiac monitor, continuous pulse ox and frequent blood pressure checks  Approach: midline  Location: L4/L5  Provider prep: mask and sterile gloves  Local infiltration: lidocaine  Agent: bupivacaine  Adjuvant medications: duramorph 0.2mg; fentanyl 20mcg.   Dose: 1.6  Dose: 1.6  Needle  Needle type: Pencan   Needle gauge: 25 G  Needle length: 3.5 in  Assessment  Sensory level: T4  Events: cerebrospinal fluid  Swirl obtained: Yes  CSF: clear  Attempts: 1  Hemodynamics: stable

## 2021-10-07 NOTE — PLAN OF CARE
Problem: Anxiety:  Goal: Level of anxiety will decrease  Description: Level of anxiety will decrease  Outcome: Met This Shift     Problem: Aspiration - Risk of:  Goal: Absence of aspiration  Description: Absence of aspiration  Outcome: Met This Shift     Problem: Tissue Perfusion - Uteroplacental, Altered:  Goal: Absence of abnormal fetal heart rate pattern  Description: Absence of abnormal fetal heart rate pattern  Outcome: Met This Shift     Problem: Venous Thromboembolism - Risk of:  Goal: Will show no signs or symptoms of venous thromboembolism  Description: Will show no signs or symptoms of venous thromboembolism  Outcome: Met This Shift     Problem: Falls - Risk of:  Goal: Absence of physical injury  Description: Absence of physical injury  Outcome: Met This Shift     Problem: Discharge Planning:  Goal: Discharged to appropriate level of care  Description: Discharged to appropriate level of care  Outcome: Met This Shift   Care plan reviewed with patient and . Patient and  verbalize understanding of the plan of care and contribute to goal setting.

## 2021-10-07 NOTE — ANESTHESIA PRE PROCEDURE
Department of Anesthesiology  Preprocedure Note       Name:  Isauro Quiroz   Age:  24 y.o.  :  2000                                          MRN:  [id number]         Date:  10/7/2021      Surgeon: Emile Ambrose):  Pawel Blevins DO    Procedure: Procedure(s):  REPEAT  SECTION    Medications prior to admission:   Prior to Admission medications    Medication Sig Start Date End Date Taking?  Authorizing Provider   Prenatal MV-Min-Fe Fum-FA-DHA (PRENATAL 1 PO) Take by mouth   Yes Historical Provider, MD       Current medications:    Current Facility-Administered Medications   Medication Dose Route Frequency Provider Last Rate Last Admin    lactated ringers infusion   IntraVENous Continuous Cliffton Gen,  mL/hr at 10/07/21 1123 New Bag at 10/07/21 1123    oxytocin (PITOCIN) 30 units in 500 mL infusion  87.3 lashae-units/min IntraVENous Continuous PRN Pawel Blevins, DO        And    oxytocin (PITOCIN) 10 unit bolus from the bag  10 Units IntraVENous PRN Cliffton Gen, DO        [facility] injection 4 mg  4 mg IntraVENous Q6H PRN Cliffradha Alcantarand, DO        ceFAZolin (ANCEF) 1000 mg in dextrose 5 % 50 mL IVPB (premix)  1,000 mg IntraVENous Once Cliffton Gen,  mL/hr at 10/07/21 1119 1,000 mg at 10/07/21 1119       Allergies:  No Known Allergies    Problem List:    Patient Active Problem List   Diagnosis Code    False labor O47.9       Past Medical History:        Diagnosis Date    Abnormal Pap smear of cervix     2 months ago in the clinic    Heart abnormality     murmur       Past Surgical History:        Procedure Laterality Date    ABDOMEN SURGERY       x1       Social History:    Social History     Tobacco Use    Smoking status: Never Smoker    Smokeless tobacco: Never Used   Substance Use Topics    Alcohol use: Never                                Counseling given: Not Answered      Vital Signs (Current):   Vitals:    10/07/21 9581 10/07/21 0939   BP:  113/71   Pulse:  79   Resp:  18   Temp: 96.3 °F (35.7 °C)    SpO2:  100%                                              BP Readings from Last 3 Encounters:   10/07/21 113/71   10/04/21 109/68       NPO Status:                                                                                 BMI:   Wt Readings from Last 3 Encounters:   10/04/21 160 lb (72.6 kg)     There is no height or weight on file to calculate BMI.    CBC:   Lab Results   Component Value Date    WBC 8.1 10/07/2021    RBC 4.00 10/07/2021    HGB 12.0 10/07/2021    HCT 36.5 10/07/2021    MCV 91.3 10/07/2021     10/07/2021       CMP: No results found for: NA, K, CL, CO2, BUN, CREATININE, GFRAA, AGRATIO, LABGLOM, GLUCOSE, PROT, CALCIUM, BILITOT, ALKPHOS, AST, ALT    POC Tests: No results for input(s): POCGLU, POCNA, POCK, POCCL, POCBUN, POCHEMO, POCHCT in the last 72 hours. Coags: No results found for: PROTIME, INR, APTT    HCG (If Applicable): No results found for: PREGTESTUR, PREGSERUM, HCG, HCGQUANT     ABGs: No results found for: PHART, PO2ART, DGW3RBK, YQO8SZA, BEART, C5PBURLK     Type & Screen (If Applicable):  Lab Results   Component Value Date    LABRH POS 10/07/2021       Drug/Infectious Status (If Applicable):  No results found for: HIV, HEPCAB    COVID-19 Screening (If Applicable): No results found for: COVID19        Anesthesia Evaluation  Patient summary reviewed and Nursing notes reviewed no history of anesthetic complications:   Airway: Mallampati: I        Dental:          Pulmonary: breath sounds clear to auscultation                             Cardiovascular:  Exercise tolerance: good (>4 METS),           Rhythm: regular  Rate: normal                    Neuro/Psych:               GI/Hepatic/Renal:             Endo/Other:                     Abdominal:       Abdomen: soft. Vascular:           Other Findings:             Anesthesia Plan      spinal     ASA 2           MIPS: Postoperative opioids intended and Prophylactic antiemetics administered. Anesthetic plan and risks discussed with patient and spouse. Plan discussed with CRNA.                 67 Yaima Kern, DO   10/7/2021

## 2021-10-07 NOTE — FLOWSHEET NOTE
Pt to room 5c08 per bed after  delivery of male. Vitals all WNL. Baby skin-to-skin. Assessment all wnl. Dressing dry and intact; fundus u/1. Ice chips given.

## 2021-10-08 LAB
BASOPHILS # BLD: 0.2 %
BASOPHILS ABSOLUTE: 0 THOU/MM3 (ref 0–0.1)
EOSINOPHIL # BLD: 0.7 %
EOSINOPHILS ABSOLUTE: 0.1 THOU/MM3 (ref 0–0.4)
ERYTHROCYTE [DISTWIDTH] IN BLOOD BY AUTOMATED COUNT: 13.2 % (ref 11.5–14.5)
ERYTHROCYTE [DISTWIDTH] IN BLOOD BY AUTOMATED COUNT: 44.3 FL (ref 35–45)
HCT VFR BLD CALC: 31.9 % (ref 37–47)
HEMOGLOBIN: 10.4 GM/DL (ref 12–16)
IMMATURE GRANS (ABS): 0.05 THOU/MM3 (ref 0–0.07)
IMMATURE GRANULOCYTES: 0.4 %
LYMPHOCYTES # BLD: 17.1 %
LYMPHOCYTES ABSOLUTE: 2.2 THOU/MM3 (ref 1–4.8)
MCH RBC QN AUTO: 30.1 PG (ref 26–33)
MCHC RBC AUTO-ENTMCNC: 32.6 GM/DL (ref 32.2–35.5)
MCV RBC AUTO: 92.2 FL (ref 81–99)
MONOCYTES # BLD: 8.1 %
MONOCYTES ABSOLUTE: 1 THOU/MM3 (ref 0.4–1.3)
NUCLEATED RED BLOOD CELLS: 0 /100 WBC
PLATELET # BLD: 239 THOU/MM3 (ref 130–400)
PMV BLD AUTO: 10.2 FL (ref 9.4–12.4)
RBC # BLD: 3.46 MILL/MM3 (ref 4.2–5.4)
SEG NEUTROPHILS: 73.5 %
SEGMENTED NEUTROPHILS ABSOLUTE COUNT: 9.4 THOU/MM3 (ref 1.8–7.7)
WBC # BLD: 12.8 THOU/MM3 (ref 4.8–10.8)

## 2021-10-08 PROCEDURE — 6360000002 HC RX W HCPCS: Performed by: ANESTHESIOLOGY

## 2021-10-08 PROCEDURE — 2580000003 HC RX 258: Performed by: OBSTETRICS & GYNECOLOGY

## 2021-10-08 PROCEDURE — 1220000000 HC SEMI PRIVATE OB R&B

## 2021-10-08 PROCEDURE — 36415 COLL VENOUS BLD VENIPUNCTURE: CPT

## 2021-10-08 PROCEDURE — 85025 COMPLETE CBC W/AUTO DIFF WBC: CPT

## 2021-10-08 PROCEDURE — 6370000000 HC RX 637 (ALT 250 FOR IP): Performed by: OBSTETRICS & GYNECOLOGY

## 2021-10-08 PROCEDURE — 6360000002 HC RX W HCPCS: Performed by: OBSTETRICS & GYNECOLOGY

## 2021-10-08 RX ORDER — OXYCODONE HYDROCHLORIDE 5 MG/1
5 TABLET ORAL EVERY 6 HOURS PRN
Qty: 20 TABLET | Refills: 0 | Status: SHIPPED | OUTPATIENT
Start: 2021-10-08 | End: 2021-10-15

## 2021-10-08 RX ADMIN — SODIUM CHLORIDE, POTASSIUM CHLORIDE, SODIUM LACTATE AND CALCIUM CHLORIDE: 600; 310; 30; 20 INJECTION, SOLUTION INTRAVENOUS at 02:30

## 2021-10-08 RX ADMIN — KETOROLAC TROMETHAMINE 15 MG: 30 INJECTION, SOLUTION INTRAMUSCULAR; INTRAVENOUS at 00:38

## 2021-10-08 RX ADMIN — ACETAMINOPHEN 1000 MG: 500 TABLET ORAL at 15:00

## 2021-10-08 RX ADMIN — DOCUSATE SODIUM 100 MG: 100 CAPSULE ORAL at 08:22

## 2021-10-08 RX ADMIN — IBUPROFEN 800 MG: 800 TABLET, FILM COATED ORAL at 18:23

## 2021-10-08 RX ADMIN — KETOROLAC TROMETHAMINE 15 MG: 30 INJECTION, SOLUTION INTRAMUSCULAR; INTRAVENOUS at 06:15

## 2021-10-08 RX ADMIN — KETOROLAC TROMETHAMINE 30 MG: 30 INJECTION, SOLUTION INTRAMUSCULAR; INTRAVENOUS at 12:57

## 2021-10-08 RX ADMIN — ONDANSETRON 4 MG: 2 INJECTION INTRAMUSCULAR; INTRAVENOUS at 04:25

## 2021-10-08 RX ADMIN — OXYCODONE 10 MG: 5 TABLET ORAL at 08:22

## 2021-10-08 RX ADMIN — SODIUM CHLORIDE, POTASSIUM CHLORIDE, SODIUM LACTATE AND CALCIUM CHLORIDE: 600; 310; 30; 20 INJECTION, SOLUTION INTRAVENOUS at 10:00

## 2021-10-08 ASSESSMENT — PAIN SCALES - GENERAL
PAINLEVEL_OUTOF10: 0
PAINLEVEL_OUTOF10: 6
PAINLEVEL_OUTOF10: 3
PAINLEVEL_OUTOF10: 4
PAINLEVEL_OUTOF10: 5
PAINLEVEL_OUTOF10: 4
PAINLEVEL_OUTOF10: 8

## 2021-10-08 ASSESSMENT — PAIN DESCRIPTION - ONSET: ONSET: ON-GOING

## 2021-10-08 ASSESSMENT — PAIN DESCRIPTION - DESCRIPTORS: DESCRIPTORS: SORE

## 2021-10-08 ASSESSMENT — PAIN DESCRIPTION - FREQUENCY: FREQUENCY: CONTINUOUS

## 2021-10-08 ASSESSMENT — PAIN DESCRIPTION - LOCATION: LOCATION: ABDOMEN;INCISION

## 2021-10-08 ASSESSMENT — PAIN DESCRIPTION - PAIN TYPE: TYPE: ACUTE PAIN;SURGICAL PAIN

## 2021-10-08 ASSESSMENT — PAIN - FUNCTIONAL ASSESSMENT: PAIN_FUNCTIONAL_ASSESSMENT: ACTIVITIES ARE NOT PREVENTED

## 2021-10-08 ASSESSMENT — PAIN DESCRIPTION - PROGRESSION: CLINICAL_PROGRESSION: NOT CHANGED

## 2021-10-08 NOTE — ANESTHESIA POSTPROCEDURE EVALUATION
Department of Anesthesiology  Postprocedure Note    Patient: Miquel Perkins  MRN: [id number]  YOB: 2000  Date of evaluation: 10/8/2021  Time:  7:52 AM     Procedure Summary     Date: 10/07/21 Room / Location: [facility]  [facility]    Anesthesia Start: 790 Anesthesia Stop: 7977    Procedure: REPEAT  SECTION (N/A Uterus) Diagnosis: [facility])    Surgeons: Casey Becker DO Responsible Provider: Kinza Miller DO    Anesthesia Type: spinal ASA Status: 2          Anesthesia Type: spinal    Robyn Phase I: Robyn Score: 9    Robyn Phase II: Robyn Score: 9    Last vitals: Reviewed and per EMR flowsheets.        Anesthesia Post Evaluation    Patient location during evaluation: floor  Patient participation: complete - patient participated  Level of consciousness: awake  Airway patency: patent  Nausea & Vomiting: no vomiting and no nausea  Complications: no  Cardiovascular status: hemodynamically stable  Respiratory status: acceptable  Hydration status: stable

## 2021-10-08 NOTE — PROGRESS NOTES
Subjective:     Postpartum Day 1:  Delivery    Patient doing well. Pain controlled with medications, pain 5/10. Mild lochia. Bottlefeeding without difficulty. IV infusing. Staples was just removed, waiting to urinate, denies flatus and bowel movement. Objective:        Vitals:    10/08/21 0824   BP: 106/64   Pulse: 86   Resp: 16   Temp: 98.5 °F (36.9 °C)   SpO2: 99%         General:    alert, cooperative       Abdomen: Soft, abdominal binder on       Incision:  covered, warm and dry   Extremities:  warm and dry. No edema. CBC   Lab Results   Component Value Date    WBC 12.8 (H) 10/08/2021    HGB 10.4 (L) 10/08/2021    HCT 31.9 (L) 10/08/2021    MCV 92.2 10/08/2021     10/08/2021        Assessment:     Status post  section. Doing well postop.  service used. Plan:     Continue current care, encourage ambulation, and cough and deep breathing. Discharge for tomorrow.        Electronically signed by ISAC Rojas CNP on 10/8/2021 at 10:50 AM

## 2021-10-08 NOTE — PROGRESS NOTES
Patient up to bathroom with standby assistance and was able to void a large amount of clear, yellow urine. Rubra lochia small with no clots noted. Patient able to perform self juan care and ambulate back to bed.

## 2021-10-08 NOTE — PLAN OF CARE
Problem: Discharge Planning:  Goal: Discharged to appropriate level of care  Description: Discharged to appropriate level of care  10/8/2021 1710 by Nils Hale RN  Outcome: Ongoing  Note: Patient plans to be discharged home with support when appropriate. 10/8/2021 0341 by Sheri Cota RN  Outcome: Ongoing  Note: Remains in hospital, discussed possible discharge needs. Problem: Fluid Volume - Imbalance:  Goal: Absence of postpartum hemorrhage signs and symptoms  Description: Absence of postpartum hemorrhage signs and symptoms  10/8/2021 1710 by Nils Hale RN  Outcome: Ongoing  Note: Patient fundus firm, midline, U/-1. Scant to small amount of rubra lochia noted this shift. 10/8/2021 0341 by Sheri Cota RN  Outcome: Ongoing  Note: Vaginal bleeding WNL, Fundus firm and midline, no clots or foul odors. Goal: Absence of imbalanced fluid volume signs and symptoms  Description: Absence of imbalanced fluid volume signs and symptoms  10/8/2021 1710 by Nils Hale RN  Outcome: Ongoing  Note: Patient tolerating PO intake, IV removed per protocol and patient is voiding without difficulty. 10/8/2021 0341 by Sheri Cota RN  Outcome: Ongoing  Note: S/P fluid bolus. Urine output adequate     Problem: Infection - Surgical Site:  Goal: Will show no infection signs and symptoms  Description: Will show no infection signs and symptoms  10/8/2021 1710 by Nils Hale RN  Outcome: Ongoing  Note: Patient remains afebrile. Abdominal incision dressing remains clean, dry, intact. No signs of infection at this time. 10/8/2021 0341 by Sheri Cota RN  Outcome: Ongoing  Note: Vital signs and assessments WNL. Problem: Mood - Altered:  Goal: Mood stable  Description: Mood stable  10/8/2021 1710 by Nils Hale RN  Outcome: Ongoing  Note: Patient's mood calm and cooperative at this time. 10/8/2021 0341 by Sheri Cota RN  Outcome: Ongoing  Note: Bonding with baby, participating in infant care. Problem: Nausea/Vomiting:  Goal: Absence of nausea/vomiting  Description: Absence of nausea/vomiting  10/8/2021 1710 by Dl Manuel RN  Outcome: Ongoing  Note: Patient remains free from nausea and vomiting at this time. 10/8/2021 0341 by Damaris Cano RN  Outcome: Ongoing  Note: Nausea improved since phenergan shot     Problem: Pain - Acute:  Goal: Pain level will decrease  Description: Pain level will decrease  10/8/2021 1710 by Dl Manuel RN  Outcome: Ongoing  Note: Patient reports pain near abdominal incision but states she is comfortable at a 4. Patient states ice, rest, abdominal binder, and prn medication help with the pain. 10/8/2021 0341 by Damaris Cano RN  Outcome: Ongoing  Note: Pain controlled with IV toradol. Discussed ice for perineal pain and/or incisional pain or the use of warm blanket/heating pad for uterine cramps. Pt states her pain goal 4/10 has been met. Problem: Venous Thromboembolism:  Goal: Absence of signs or symptoms of impaired coagulation  Description: Absence of signs or symptoms of impaired coagulation  10/8/2021 1710 by Dl Manuel RN  Outcome: Ongoing  Note: Patient denies any calf tenderness. No signs of DVT noted at this time. 10/8/2021 0341 by Damaris Cano RN  Outcome: Ongoing  Note: Homans sign negative      Problem: Pain:  Goal: Pain level will decrease  Description: Pain level will decrease  10/8/2021 1710 by Dl Manuel RN  Outcome: Ongoing  Note: Patient reports pain near abdominal incision but states she is comfortable at a 4. Patient states ice, rest, abdominal binder, and prn medication help with the pain. 10/8/2021 0341 by Damaris Cano RN  Outcome: Ongoing  Note: Pain controlled with IV toradol. Discussed ice for perineal pain and/or incisional pain or the use of warm blanket/heating pad for uterine cramps. Pt states her pain goal 4/10 has been met.    Goal: Control of acute pain  Description: Control of acute pain  10/8/2021 1710 by Dl Manuel RN  Outcome: Ongoing  10/8/2021 0341 by Dmaaris Cano RN  Outcome: Ongoing  Note: Pain controlled with po meds. Discussed ice for perineal pain and/or incisional pain or the use of warm blanket/heating pad for uterine cramps. Pt states her pain goal 4/10 has been met. Care plan reviewed with patient and spouse. Patient and spouse verbalize understanding of the plan of care and contribute to goal setting.

## 2021-10-08 NOTE — PROGRESS NOTES
Patient up to void with standby assistance and voided large amount of clear, yellow urine. Patient able to perform self juan care. Rubra lochia is scant. IV removed per protocol.

## 2021-10-08 NOTE — DISCHARGE SUMMARY
Obstetric Discharge Summary      Pt Name: Fran Quiroz  MRN: [id number] [place] #: [de-identified]  YOB: 2000        Admitting Diagnosis  IUP  OB History        2    Para   2    Term   2            AB        Living   2       SAB        TAB        Ectopic        Molar        Multiple   0    Live Births   2                Reasons for Admission on 10/7/2021  9:13 AM   delivery delivered [O82]   delivery delivered [O82]    Postpartum/Operative Complications       [place] Data  Information for the patient's :  Cliff Roberts [[id number]]   male   Birth Weight: 7 lb 1.2 oz (3.21 kg)       Discharge Diagnosis  Postpartum, C/S Delivery    Discharge Information  Current Discharge Medication List      START taking these medications    Details   oxyCODONE (ROXICODONE) 5 MG immediate release tablet Take 1 tablet by mouth every 6 hours as needed for Pain for up to 7 days.   Qty: 20 tablet, Refills: 0    Comments: Reduce doses taken as pain becomes manageable  Associated Diagnoses:  delivery delivered         CONTINUE these medications which have NOT CHANGED    Details   Prenatal MV-Min-Fe Fum-FA-DHA (PRENATAL 1 PO) Take by mouth                 C/S Delivery  Diet regular    Condition: Stable  Discharge to:  home  Follow up in 1 and 4 weeks    Patient to be discharged on 10/9/21    Electronically signed by ISAC Brown CNP on 10/8/2021 at 10:55 AM

## 2021-10-08 NOTE — PROGRESS NOTES
RN called and left message with [address] [place] [address] regarding baby's follow up. Waiting on call back at this time.

## 2021-10-09 VITALS
OXYGEN SATURATION: 99 % | DIASTOLIC BLOOD PRESSURE: 56 MMHG | TEMPERATURE: 97.8 F | HEART RATE: 79 BPM | SYSTOLIC BLOOD PRESSURE: 112 MMHG | RESPIRATION RATE: 16 BRPM

## 2021-10-09 PROCEDURE — 6370000000 HC RX 637 (ALT 250 FOR IP): Performed by: OBSTETRICS & GYNECOLOGY

## 2021-10-09 RX ADMIN — OXYCODONE 5 MG: 5 TABLET ORAL at 00:18

## 2021-10-09 RX ADMIN — OXYCODONE 5 MG: 5 TABLET ORAL at 11:03

## 2021-10-09 RX ADMIN — ACETAMINOPHEN 1000 MG: 500 TABLET ORAL at 00:18

## 2021-10-09 RX ADMIN — DOCUSATE SODIUM 100 MG: 100 CAPSULE ORAL at 08:47

## 2021-10-09 RX ADMIN — DOCUSATE SODIUM 100 MG: 100 CAPSULE ORAL at 00:17

## 2021-10-09 RX ADMIN — VITAMIN A, VITAMIN C, VITAMIN D-3, VITAMIN E, VITAMIN B-1, VITAMIN B-2, NIACIN, VITAMIN B-6, CALCIUM, IRON, ZINC, COPPER 1 TABLET: 4000; 120; 400; 22; 1.84; 3; 20; 10; 1; 12; 200; 27; 25; 2 TABLET ORAL at 08:47

## 2021-10-09 RX ADMIN — IBUPROFEN 800 MG: 800 TABLET, FILM COATED ORAL at 06:27

## 2021-10-09 RX ADMIN — OXYCODONE 5 MG: 5 TABLET ORAL at 06:27

## 2021-10-09 RX ADMIN — ACETAMINOPHEN 1000 MG: 500 TABLET ORAL at 11:03

## 2021-10-09 ASSESSMENT — PAIN DESCRIPTION - ONSET: ONSET: GRADUAL

## 2021-10-09 ASSESSMENT — PAIN DESCRIPTION - FREQUENCY: FREQUENCY: INTERMITTENT

## 2021-10-09 ASSESSMENT — PAIN SCALES - GENERAL
PAINLEVEL_OUTOF10: 0
PAINLEVEL_OUTOF10: 5
PAINLEVEL_OUTOF10: 7
PAINLEVEL_OUTOF10: 0
PAINLEVEL_OUTOF10: 5

## 2021-10-09 ASSESSMENT — PAIN DESCRIPTION - PROGRESSION: CLINICAL_PROGRESSION: GRADUALLY WORSENING

## 2021-10-09 ASSESSMENT — PAIN DESCRIPTION - DESCRIPTORS: DESCRIPTORS: ACHING;SORE

## 2021-10-09 ASSESSMENT — PAIN DESCRIPTION - PAIN TYPE: TYPE: SURGICAL PAIN

## 2021-10-09 ASSESSMENT — PAIN - FUNCTIONAL ASSESSMENT: PAIN_FUNCTIONAL_ASSESSMENT: ACTIVITIES ARE NOT PREVENTED

## 2021-10-09 ASSESSMENT — PAIN DESCRIPTION - LOCATION: LOCATION: INCISION

## 2021-10-09 NOTE — PLAN OF CARE
Problem: Discharge Planning:  Goal: Discharged to appropriate level of care  Description: Discharged to appropriate level of care  10/9/2021 1131 by Jolene Ring RN  Outcome: Ongoing  Note: Home going instructions given to pt and voiced understeanding with the use of video interpreted     Problem: Fluid Volume - Imbalance:  Goal: Absence of postpartum hemorrhage signs and symptoms  Description: Absence of postpartum hemorrhage signs and symptoms  10/9/2021 1131 by Jolene Ring RN  Outcome: Ongoing  Note: Scant amount of red lcohai fundus is firm and centered     Problem: Fluid Volume - Imbalance:  Goal: Absence of imbalanced fluid volume signs and symptoms  Description: Absence of imbalanced fluid volume signs and symptoms  10/9/2021 1131 by Jolene Ring RN  Outcome: Ongoing     Problem: Infection - Surgical Site:  Goal: Will show no infection signs and symptoms  Description: Will show no infection signs and symptoms  10/9/2021 1131 by Jolene Ring RN  Outcome: Ongoing  Note: V/S WNL< nountoward s/sr eported     Problem: Mood - Altered:  Goal: Mood stable  Description: Mood stable  10/9/2021 1131 by Jolene Ring RN  Outcome: Ongoing  Note: Pleaant     Problem: Pain - Acute:  Goal: Pain level will decrease  Description: Pain level will decrease  10/9/2021 1131 by Jolene Ring RN  Outcome: Ongoing  Note: Denies pain at this time, pain goal is \"4\"     Problem: Venous Thromboembolism:  Goal: Absence of signs or symptoms of impaired coagulation  Description: Absence of signs or symptoms of impaired coagulation  10/9/2021 1131 by Jolene Ring RN  Outcome: Ongoing  Note: Scant amount of red lochai no active bleeding noted     Problem: Pain:  Goal: Pain level will decrease  Description: Pain level will decrease  10/9/2021 1131 by Jolene Ring RN  Outcome: Ongoing  Note: Denies pain at this time, pain goal is \"4\"     Problem: Pain:  Goal: Control of acute pain  Description: Control of acute pain  10/9/2021 1131 by Rani Clemons RN  Outcome: Ongoing  Note: Denies pain     Problem: Nausea/Vomiting:  Goal: Absence of nausea/vomiting  Description: Absence of nausea/vomiting  10/9/2021 1131 by Rani Clemons RN  Outcome: Completed  Note: Denies nusea or vomiting   Care plan reviewed with patient and verbalized understanding. Patient contributed to goal setting.

## 2021-10-09 NOTE — FLOWSHEET NOTE
Mother and Infants assessments  Complete  Along with education with the help of  Rosemary Hewitt #[id number]

## 2021-10-09 NOTE — PLAN OF CARE
Problem: Discharge Planning:  Goal: Discharged to appropriate level of care  Description: Discharged to appropriate level of care  10/8/2021 2306 by Bob Kunz RN  Outcome: Ongoing  Note: Court Sinks in a row      Problem: Fluid Volume - Imbalance:  Goal: Absence of postpartum hemorrhage signs and symptoms  Description: Absence of postpartum hemorrhage signs and symptoms  10/8/2021 2306 by Bob Kunz RN  Outcome: Ongoing  Note: Lochia WNL      Problem: Fluid Volume - Imbalance:  Goal: Absence of imbalanced fluid volume signs and symptoms  Description: Absence of imbalanced fluid volume signs and symptoms  10/8/2021 2306 by Bob Kunz RN  Outcome: Ongoing  Note: No edema      Problem: Infection - Surgical Site:  Goal: Will show no infection signs and symptoms  Description: Will show no infection signs and symptoms  10/8/2021 2306 by Bob Kunz RN  Outcome: Ongoing  Note: No signs of infection      Problem: Mood - Altered:  Goal: Mood stable  Description: Mood stable  10/8/2021 2306 by Bob Kunz RN  Outcome: Ongoing  Note: Pt pleasant      Problem: Nausea/Vomiting:  Goal: Absence of nausea/vomiting  Description: Absence of nausea/vomiting  10/8/2021 2306 by Bob Kunz RN  Outcome: Ongoing  Note: No nausea      Problem: Pain - Acute:  Goal: Pain level will decrease  Description: Pain level will decrease  10/8/2021 2306 by Bob Kunz RN  Outcome: Ongoing  Note: Pain controlled with po meds. Discussed ice for perineal pain and/or incisional pain or the use of warm blanket/heating pad for uterine cramps. Pt states her pain goal 4/10 has been met.       Problem: Venous Thromboembolism:  Goal: Absence of signs or symptoms of impaired coagulation  Description: Absence of signs or symptoms of impaired coagulation  10/8/2021 2306 by Bob Kunz RN  Outcome: Ongoing  Note: Negative homans      Problem: Pain:  Goal: Pain level will decrease  Description: Pain level will decrease  10/8/2021 2306 by Alea Perez RN  Outcome: Ongoing  Note: Pain controlled with po meds. Discussed ice for perineal pain and/or incisional pain or the use of warm blanket/heating pad for uterine cramps. Pt states her pain goal 4/10 has been met. Problem: Pain:  Goal: Control of acute pain  Description: Control of acute pain  10/8/2021 2306 by Alea Perez RN  Outcome: Ongoing  Note: Pain controlled with po meds. Discussed ice for perineal pain and/or incisional pain or the use of warm blanket/heating pad for uterine cramps. Pt states her pain goal 4/10 has been met. Plan of care discussed with mother and she contributes to goal setting and voices understanding of plan of care.

## 2021-10-09 NOTE — PROCEDURES
Department of Obstetrics and Gynecology  Labor and Delivery   Triage Note        Pt Name: Vincent Schaumann  MRN: [id number] [place] #: [de-identified]  YOB: 2000      SUBJECTIVE: This patient presented at 45 weeks gestation complaining of contractions    OBJECTIVE    Vitals:  /68   Pulse 87   Temp 98 °F (36.7 °C)   Resp 16   Ht 5' 5.35\" (1.66 m)   Wt 160 lb (72.6 kg)   SpO2 98%   BMI 26.34 kg/m²     Fetal heart rate:         Baseline Heart Rate:  140        Accelerations:  present       Decelerations:  none       Variability:  moderate    Contraction frequency: Q 9 min    The NST was reactive        ASSESSMENT & PLAN:  Discharged to home in a stable condition and instructed to drink more water and follow up at her next scheduled appointment.     Irene Meyers DO D.O.

## 2023-01-07 ENCOUNTER — HOSPITAL ENCOUNTER (OUTPATIENT)
Age: 23
Setting detail: SPECIMEN
Discharge: HOME | End: 2023-01-07

## 2023-01-09 LAB
CULTURE: ABNORMAL
SPECIMEN DESCRIPTION: ABNORMAL

## 2023-02-09 ENCOUNTER — HOSPITAL ENCOUNTER (OUTPATIENT)
Age: 23
Setting detail: SPECIMEN
Discharge: HOME | End: 2023-02-09

## 2023-02-11 LAB
HPV SAMPLE: ABNORMAL
HPV, GENOTYPE 16: NOT DETECTED
HPV, GENOTYPE 18: NOT DETECTED
HPV, HIGH RISK OTHER: DETECTED
HPV, INTERPRETATION: ABNORMAL
SPECIMEN DESCRIPTION: ABNORMAL

## 2024-04-18 ENCOUNTER — NURSE ONLY (OUTPATIENT)
Dept: LAB | Age: 24
End: 2024-04-18

## (undated) DEVICE — SUTURE VCRL SZ 4-0 L27IN ABSRB UD L60MM KS STR REV CUT NDL J662H

## (undated) DEVICE — SUTURE SZ 2-0 L27IN ABSRB BRN CTX L36MM 1/2 CIR SGL ARMED 872H

## (undated) DEVICE — APPLICATOR PREP 26ML 0.7% IOD POVACRYLEX 74% ISO ALC ST

## (undated) DEVICE — SUTURE VCRL + SZ 0 L27IN ABSRB VLT L36MM CT-1 1/2 CIR VCPB260H

## (undated) DEVICE — Z DISCONTINUED USE 2275683 GLOVE SURG SZ 6.5 L12IN FNGR THK13MIL WHT ISOLEX

## (undated) DEVICE — PACK PROCEDURE SURG C SECT SRMC LF

## (undated) DEVICE — SUTURE VCRL + SZ 2-0 L27IN ABSRB CLR CT-1 1/2 CIR TAPERCUT VCP259H